# Patient Record
(demographics unavailable — no encounter records)

---

## 2025-06-25 NOTE — CONSULT LETTER
[Dear  ___] : Dear ~DARA, [Consult Letter:] : I had the pleasure of evaluating your patient, [unfilled]. [Consult Closing:] : Thank you very much for allowing me to participate in the care of this patient.  If you have any questions, please do not hesitate to contact me. [Sincerely,] : Sincerely, [DrJose  ___] : Dr. MCCURDY [DrJose ___] : Dr. MCCURDY [FreeTextEntry2] : Nereida Stovall [FreeTextEntry3] : Kai Markhma MD [facility]\par

## 2025-06-25 NOTE — PHYSICAL EXAM
[No Acute Distress] : no acute distress [Normal Oropharynx] : normal oropharynx [Normal Appearance] : normal appearance [No Neck Mass] : no neck mass [Normal Rate/Rhythm] : normal rate/rhythm [Normal S1, S2] : normal s1, s2 [No Murmurs] : no murmurs [No Resp Distress] : no resp distress [Clear to Auscultation Bilaterally] : clear to auscultation bilaterally [Normal to Percussion] : normal to percussion [No Abnormalities] : no abnormalities [Benign] : benign [Not Tender] : not tender [No HSM] : no hsm [Normal Gait] : normal gait [No Clubbing] : no clubbing [No Cyanosis] : no cyanosis [No Edema] : no edema [FROM] : FROM [Normal Color/ Pigmentation] : normal color/ pigmentation [No Focal Deficits] : no focal deficits [Oriented x3] : oriented x3 [Normal Affect] : normal affect [TextBox_68] : Good air entry without active wheezing.

## 2025-06-25 NOTE — DISCUSSION/SUMMARY
[FreeTextEntry1] : Asthma COPD overlap syndrome with mild decrease in function.  No significant change in symptom complex. CT lung cancer screening.  Continue. Gets via primary care.  Due mid August. Pulmonary nodule stable. Coronary calcifications. quit smoking 2012 40 pk year

## 2025-06-25 NOTE — HISTORY OF PRESENT ILLNESS
[Former] : former [TextBox_4] : Here for f/u and PFT on trelegy Feels improved on Trelegy. No cough or wheeze.  FUENTES is decreased.  Had CT. [address] [facility].  Seeing cardiology    [TextBox_11] : 1 [TextBox_13] : 40 [YearQuit] : 2012

## 2025-06-25 NOTE — ASSESSMENT
[FreeTextEntry1] : Trelegy 200 continue. PRN beta agonist. Exercise program. Follow-up in 4 months or sooner on a as needed basis. Seeing cardiology.  Will follow-up. Follow-up CT lung cancer screening in August.

## 2025-06-25 NOTE — PROCEDURE
[FreeTextEntry1] : CT lung cancer screening August 19 2024. Lung Rads 2  06/25/2025 Pulmonary function testing Moderate airflow limitation without significant bronchodilator response. Essentially normal lung volumes. Increased airway resistance. Moderate to severe diffusion impairment. Compared to October 2023 there was a mild decrease in function.